# Patient Record
Sex: MALE | Race: WHITE
[De-identification: names, ages, dates, MRNs, and addresses within clinical notes are randomized per-mention and may not be internally consistent; named-entity substitution may affect disease eponyms.]

---

## 2019-01-24 ENCOUNTER — HOSPITAL ENCOUNTER (EMERGENCY)
Dept: HOSPITAL 8 - ED | Age: 79
Discharge: HOME | End: 2019-01-24
Payer: MEDICARE

## 2019-01-24 VITALS — BODY MASS INDEX: 20.57 KG/M2 | HEIGHT: 69 IN | WEIGHT: 138.89 LBS

## 2019-01-24 VITALS — DIASTOLIC BLOOD PRESSURE: 90 MMHG | SYSTOLIC BLOOD PRESSURE: 154 MMHG

## 2019-01-24 DIAGNOSIS — N40.1: Primary | ICD-10-CM

## 2019-01-24 DIAGNOSIS — R33.8: ICD-10-CM

## 2019-01-24 LAB
ALBUMIN SERPL-MCNC: 3.5 G/DL (ref 3.4–5)
ANION GAP SERPL CALC-SCNC: 8 MMOL/L (ref 5–15)
BASOPHILS # BLD AUTO: 0.03 X10^3/UL (ref 0–0.1)
BASOPHILS NFR BLD AUTO: 0 % (ref 0–1)
CALCIUM SERPL-MCNC: 11.5 MG/DL (ref 8.5–10.1)
CHLORIDE SERPL-SCNC: 107 MMOL/L (ref 98–107)
CREAT SERPL-MCNC: 0.75 MG/DL (ref 0.7–1.3)
CULTURE INDICATED?: NO
EOSINOPHIL # BLD AUTO: 0.01 X10^3/UL (ref 0–0.4)
EOSINOPHIL NFR BLD AUTO: 0 % (ref 1–7)
ERYTHROCYTE [DISTWIDTH] IN BLOOD BY AUTOMATED COUNT: 13.3 % (ref 9.4–14.8)
LYMPHOCYTES # BLD AUTO: 0.66 X10^3/UL (ref 1–3.4)
LYMPHOCYTES NFR BLD AUTO: 5 % (ref 22–44)
MCH RBC QN AUTO: 31.5 PG (ref 27.5–34.5)
MCHC RBC AUTO-ENTMCNC: 33.8 G/DL (ref 33.2–36.2)
MCV RBC AUTO: 93.1 FL (ref 81–97)
MD: NO
MICROSCOPIC: (no result)
MONOCYTES # BLD AUTO: 0.29 X10^3/UL (ref 0.2–0.8)
MONOCYTES NFR BLD AUTO: 2 % (ref 2–9)
NEUTROPHILS # BLD AUTO: 13.52 X10^3/UL (ref 1.8–6.8)
NEUTROPHILS NFR BLD AUTO: 93 % (ref 42–75)
PLATELET # BLD AUTO: 214 X10^3/UL (ref 130–400)
PMV BLD AUTO: 9.4 FL (ref 7.4–10.4)
RBC # BLD AUTO: 5.18 X10^6/UL (ref 4.38–5.82)

## 2019-01-24 PROCEDURE — 80048 BASIC METABOLIC PNL TOTAL CA: CPT

## 2019-01-24 PROCEDURE — 85025 COMPLETE CBC W/AUTO DIFF WBC: CPT

## 2019-01-24 PROCEDURE — 51702 INSERT TEMP BLADDER CATH: CPT

## 2019-01-24 PROCEDURE — 99284 EMERGENCY DEPT VISIT MOD MDM: CPT

## 2019-01-24 PROCEDURE — 36415 COLL VENOUS BLD VENIPUNCTURE: CPT

## 2019-01-24 PROCEDURE — 82040 ASSAY OF SERUM ALBUMIN: CPT

## 2019-01-24 PROCEDURE — 81001 URINALYSIS AUTO W/SCOPE: CPT

## 2019-01-24 NOTE — NUR
TASK RN: PT REPORTS URINARY RETENTION X LAST NIGHT. LAST NORMAL URINATION LAST 
NIGHT AT 2200. PT ALSO CO CONSTIPATION, LBM TWO DAYS AGO, "THAT ISN'T NOT 
NORMAL FOR ME". PT DENIES BLOOD IN URINE/FLANK PAIN/N/V/FEVER. HX OF ENLARGED 
PROSTATE, "BUT ANOTHER DOCTOR TOOK A LOOK AT ME AND SAID I DIDN'T HAVE AN 
ENLARGED PROSTATE". 



BLADDER SCAN COMPLETE, >999ML. ERP AWARE. VERBAL ORDER RECEIVED FOR SOLITARIO. 12F 
COUDE TIP SOLITARIO INSERTED WO DIFFICULTY W/ IMMEDIATE URINE RETURN. 700ML OUT. PT 
W/ IMMEDIATE RELIEF. 



UA COLLECTED AWAITING ORDER TO SEND TO LAB.

## 2019-01-26 ENCOUNTER — HOSPITAL ENCOUNTER (EMERGENCY)
Dept: HOSPITAL 8 - ED | Age: 79
Discharge: HOME | End: 2019-01-26
Payer: MEDICARE

## 2019-01-26 VITALS — DIASTOLIC BLOOD PRESSURE: 73 MMHG | SYSTOLIC BLOOD PRESSURE: 126 MMHG

## 2019-01-26 VITALS — HEIGHT: 69 IN | WEIGHT: 136.91 LBS | BODY MASS INDEX: 20.28 KG/M2

## 2019-01-26 DIAGNOSIS — T83.098D: ICD-10-CM

## 2019-01-26 DIAGNOSIS — R10.2: Primary | ICD-10-CM

## 2019-01-26 LAB
CULTURE INDICATED?: YES
MICROSCOPIC: (no result)

## 2019-01-26 PROCEDURE — 87086 URINE CULTURE/COLONY COUNT: CPT

## 2019-01-26 PROCEDURE — 81001 URINALYSIS AUTO W/SCOPE: CPT

## 2019-01-26 PROCEDURE — 99283 EMERGENCY DEPT VISIT LOW MDM: CPT

## 2019-01-26 NOTE — NUR
PT PRESENTED WITH LEAKING SOLITARIO CATHETER THAT WAS PLACED HERE AT [facility] 
THURSDAY. PT STATES THAT URINE IS LEAKING AROUND THE CATHETER. ERP AT BEDSIDE 
FOR EVAL

## 2019-09-10 ENCOUNTER — HOSPITAL ENCOUNTER (EMERGENCY)
Dept: HOSPITAL 8 - ED | Age: 79
Discharge: HOME | End: 2019-09-10
Payer: MEDICARE

## 2019-09-10 VITALS — HEIGHT: 69 IN | BODY MASS INDEX: 20.02 KG/M2 | WEIGHT: 135.14 LBS

## 2019-09-10 VITALS — DIASTOLIC BLOOD PRESSURE: 80 MMHG | SYSTOLIC BLOOD PRESSURE: 119 MMHG

## 2019-09-10 DIAGNOSIS — N40.1: Primary | ICD-10-CM

## 2019-09-10 DIAGNOSIS — F17.200: ICD-10-CM

## 2019-09-10 DIAGNOSIS — R33.8: ICD-10-CM

## 2019-09-10 LAB
ALBUMIN SERPL-MCNC: 3.5 G/DL (ref 3.4–5)
ANION GAP SERPL CALC-SCNC: 6 MMOL/L (ref 5–15)
BASOPHILS # BLD AUTO: 0.02 X10^3/UL (ref 0–0.1)
BASOPHILS NFR BLD AUTO: 0 % (ref 0–1)
CALCIUM SERPL-MCNC: 11.2 MG/DL (ref 8.5–10.1)
CHLORIDE SERPL-SCNC: 105 MMOL/L (ref 98–107)
CREAT SERPL-MCNC: 0.79 MG/DL (ref 0.7–1.3)
CULTURE INDICATED?: NO
EOSINOPHIL # BLD AUTO: 0.02 X10^3/UL (ref 0–0.4)
EOSINOPHIL NFR BLD AUTO: 0 % (ref 1–7)
ERYTHROCYTE [DISTWIDTH] IN BLOOD BY AUTOMATED COUNT: 13 % (ref 9.4–14.8)
LYMPHOCYTES # BLD AUTO: 1.06 X10^3/UL (ref 1–3.4)
LYMPHOCYTES NFR BLD AUTO: 8 % (ref 22–44)
MCH RBC QN AUTO: 32.4 PG (ref 27.5–34.5)
MCHC RBC AUTO-ENTMCNC: 33.4 G/DL (ref 33.2–36.2)
MCV RBC AUTO: 96.9 FL (ref 81–97)
MD: NO
MICROSCOPIC: (no result)
MONOCYTES # BLD AUTO: 0.36 X10^3/UL (ref 0.2–0.8)
MONOCYTES NFR BLD AUTO: 3 % (ref 2–9)
NEUTROPHILS # BLD AUTO: 11.57 X10^3/UL (ref 1.8–6.8)
NEUTROPHILS NFR BLD AUTO: 89 % (ref 42–75)
PLATELET # BLD AUTO: 197 X10^3/UL (ref 130–400)
PMV BLD AUTO: 8.7 FL (ref 7.4–10.4)
RBC # BLD AUTO: 4.96 X10^6/UL (ref 4.38–5.82)

## 2019-09-10 PROCEDURE — 36415 COLL VENOUS BLD VENIPUNCTURE: CPT

## 2019-09-10 PROCEDURE — 85025 COMPLETE CBC W/AUTO DIFF WBC: CPT

## 2019-09-10 PROCEDURE — 51702 INSERT TEMP BLADDER CATH: CPT

## 2019-09-10 PROCEDURE — 81003 URINALYSIS AUTO W/O SCOPE: CPT

## 2019-09-10 PROCEDURE — 80048 BASIC METABOLIC PNL TOTAL CA: CPT

## 2019-09-10 PROCEDURE — 99284 EMERGENCY DEPT VISIT MOD MDM: CPT

## 2019-09-10 PROCEDURE — 82040 ASSAY OF SERUM ALBUMIN: CPT

## 2019-09-10 NOTE — NUR
SOLITARIO CARE INSTRUCTION GIVEN, LEG BAG AND CLEANING AND EMPTYING.  PT VERBALZIED 
UNDERSTANDING WILL FOLLOW UP WITH UROLOGY

## 2019-09-10 NOTE — NUR
SOLITARIO INSERTED USING STERILE TECH 16 COUDE, IMMEDIATLEY RECEIVED 850CC OF CLEAR 
YELLOW URINE,UA TO LAB

## 2019-09-10 NOTE — NUR
TASK RN: First contact with pt. Pt states, "It started at 3:30 this morning. I 
can dribble a little and that helps relieve some of the pressure, but I am not 
really peeing. I have had this happen before." NADN. Bladder scan shows >878mL 
of urine. Call light within reach. Pt denies blood in urine, stool, n/v/d, sob, 
cp, trauma, or syncope.

## 2019-09-10 NOTE — NUR
TASK RN: Pt ambulaes from triage to room with steady gait and balance. NADN. No 
obvious defecits observed.

## 2020-04-05 ENCOUNTER — HOSPITAL ENCOUNTER (EMERGENCY)
Dept: HOSPITAL 8 - ED | Age: 80
Discharge: HOME | End: 2020-04-05
Payer: MEDICARE

## 2020-04-05 VITALS — BODY MASS INDEX: 19.36 KG/M2 | HEIGHT: 69 IN | WEIGHT: 130.73 LBS

## 2020-04-05 VITALS — SYSTOLIC BLOOD PRESSURE: 112 MMHG | DIASTOLIC BLOOD PRESSURE: 65 MMHG

## 2020-04-05 DIAGNOSIS — E83.52: ICD-10-CM

## 2020-04-05 DIAGNOSIS — N40.1: Primary | ICD-10-CM

## 2020-04-05 DIAGNOSIS — R33.8: ICD-10-CM

## 2020-04-05 LAB
ALBUMIN SERPL-MCNC: 3.8 G/DL (ref 3.4–5)
ANION GAP SERPL CALC-SCNC: 7 MMOL/L (ref 5–15)
BASOPHILS # BLD AUTO: 0.04 X10^3/UL (ref 0–0.1)
BASOPHILS NFR BLD AUTO: 0 % (ref 0–1)
CALCIUM SERPL-MCNC: 11.6 MG/DL (ref 8.5–10.1)
CHLORIDE SERPL-SCNC: 106 MMOL/L (ref 98–107)
CREAT SERPL-MCNC: 0.73 MG/DL (ref 0.7–1.3)
CULTURE INDICATED?: NO
EOSINOPHIL # BLD AUTO: 0.01 X10^3/UL (ref 0–0.4)
EOSINOPHIL NFR BLD AUTO: 0 % (ref 1–7)
ERYTHROCYTE [DISTWIDTH] IN BLOOD BY AUTOMATED COUNT: 13.4 % (ref 9.4–14.8)
LYMPHOCYTES # BLD AUTO: 0.76 X10^3/UL (ref 1–3.4)
LYMPHOCYTES NFR BLD AUTO: 7 % (ref 22–44)
MCH RBC QN AUTO: 31.2 PG (ref 27.5–34.5)
MCHC RBC AUTO-ENTMCNC: 33.1 G/DL (ref 33.2–36.2)
MCV RBC AUTO: 94.4 FL (ref 81–97)
MD: NO
MICROSCOPIC: (no result)
MONOCYTES # BLD AUTO: 0.38 X10^3/UL (ref 0.2–0.8)
MONOCYTES NFR BLD AUTO: 4 % (ref 2–9)
NEUTROPHILS # BLD AUTO: 9.22 X10^3/UL (ref 1.8–6.8)
NEUTROPHILS NFR BLD AUTO: 89 % (ref 42–75)
PLATELET # BLD AUTO: 210 X10^3/UL (ref 130–400)
PMV BLD AUTO: 9.1 FL (ref 7.4–10.4)
RBC # BLD AUTO: 5.25 X10^6/UL (ref 4.38–5.82)

## 2020-04-05 PROCEDURE — 99284 EMERGENCY DEPT VISIT MOD MDM: CPT

## 2020-04-05 PROCEDURE — 80048 BASIC METABOLIC PNL TOTAL CA: CPT

## 2020-04-05 PROCEDURE — 51702 INSERT TEMP BLADDER CATH: CPT

## 2020-04-05 PROCEDURE — 36415 COLL VENOUS BLD VENIPUNCTURE: CPT

## 2020-04-05 PROCEDURE — 85025 COMPLETE CBC W/AUTO DIFF WBC: CPT

## 2020-04-05 PROCEDURE — 81001 URINALYSIS AUTO W/SCOPE: CPT

## 2020-04-05 PROCEDURE — 82040 ASSAY OF SERUM ALBUMIN: CPT

## 2020-04-05 NOTE — NUR
pt reports bladder pressure relieved, approx 1000mL total clear yellow urine 
drained from holley bag. MD aware. pt given dc instructions with instructions to 
call urologist on dc paperwork for follow up appt. pt given education regarding 
holley cleaning/maintenance. pt ambulatory to dc desk with steady gait, all 
questions answered.

## 2020-04-05 NOTE — NUR
pt presents to ED with c/o urinary retention since yesterday pm, pt states he 
hx urinary retention/holley placement in the past, pt states he has been 
prescribed meds for urinary retention but did not fill rx as he was fearful of 
listed side effects. pt states he has had difficulty with urine leaking around 
holley in past, this RN placed 18F coude holley cath with EDMD's ok. Clear yellow 
urine draining freely from holley at this time with no leakage. urine sample 
collected from holley placement, sent to lab. awaiting lab results and dispo at 
this time.

## 2020-12-26 ENCOUNTER — HOSPITAL ENCOUNTER (EMERGENCY)
Dept: HOSPITAL 8 - ED | Age: 80
Discharge: HOME | End: 2020-12-26
Payer: MEDICARE

## 2020-12-26 VITALS — HEIGHT: 68 IN | BODY MASS INDEX: 18.21 KG/M2 | WEIGHT: 120.15 LBS

## 2020-12-26 VITALS — DIASTOLIC BLOOD PRESSURE: 71 MMHG | SYSTOLIC BLOOD PRESSURE: 106 MMHG

## 2020-12-26 DIAGNOSIS — J15.9: Primary | ICD-10-CM

## 2020-12-26 DIAGNOSIS — F17.220: ICD-10-CM

## 2020-12-26 DIAGNOSIS — R00.1: ICD-10-CM

## 2020-12-26 DIAGNOSIS — F17.210: ICD-10-CM

## 2020-12-26 LAB
<PLATELET ESTIMATE>: (no result)
<PLT MORPHOLOGY>: (no result)
ALBUMIN SERPL-MCNC: 3 G/DL (ref 3.4–5)
ALP SERPL-CCNC: 114 U/L (ref 45–117)
ALT SERPL-CCNC: 19 U/L (ref 12–78)
ANION GAP SERPL CALC-SCNC: 3 MMOL/L (ref 5–15)
BAND#(MANUAL): 0.94 X10^3/UL
BILIRUB DIRECT SERPL-MCNC: NORMAL MG/DL
BILIRUB SERPL-MCNC: 0.5 MG/DL (ref 0.2–1)
CALCIUM SERPL-MCNC: 13.4 MG/DL (ref 8.5–10.1)
CHLORIDE SERPL-SCNC: 101 MMOL/L (ref 98–107)
CREAT SERPL-MCNC: 1.02 MG/DL (ref 0.7–1.3)
ERYTHROCYTE [DISTWIDTH] IN BLOOD BY AUTOMATED COUNT: 12.2 % (ref 9.4–14.8)
LYMPH#(MANUAL): 2.12 X10^3/UL (ref 1–3.4)
LYMPHS% (MANUAL): 9 % (ref 22–44)
MCH RBC QN AUTO: 31 PG (ref 27.5–34.5)
MCHC RBC AUTO-ENTMCNC: 33.6 G/DL (ref 33.2–36.2)
MD: YES
METAMYELOCYTES# (MANUAL): 0.24 X10^3/UL (ref 0–0)
METAMYELOCYTES% (MANUAL): 1 % (ref 0–1)
MONOS#(MANUAL): 1.65 X10^3/UL (ref 0.3–2.7)
MONOS% (MANUAL): 7 % (ref 2–9)
NEUTS BAND NFR BLD: 4 % (ref 0–7)
PLATELET # BLD AUTO: 470 X10^3/UL (ref 130–400)
PMV BLD AUTO: 8 FL (ref 7.4–10.4)
PROT SERPL-MCNC: 8.4 G/DL (ref 6.4–8.2)
RBC # BLD AUTO: 5.25 X10^6/UL (ref 4.38–5.82)
SEG#(MANUAL): 18.57 X10^3/UL (ref 1.8–6.8)
SEGS% (MANUAL): 79 % (ref 42–75)
TROPONIN I SERPL-MCNC: < 0.015 NG/ML (ref 0–0.04)

## 2020-12-26 PROCEDURE — 99285 EMERGENCY DEPT VISIT HI MDM: CPT

## 2020-12-26 PROCEDURE — 80053 COMPREHEN METABOLIC PANEL: CPT

## 2020-12-26 PROCEDURE — 71250 CT THORAX DX C-: CPT

## 2020-12-26 PROCEDURE — 96365 THER/PROPH/DIAG IV INF INIT: CPT

## 2020-12-26 PROCEDURE — 93005 ELECTROCARDIOGRAM TRACING: CPT

## 2020-12-26 PROCEDURE — 85025 COMPLETE CBC W/AUTO DIFF WBC: CPT

## 2020-12-26 PROCEDURE — 74022 RADEX COMPL AQT ABD SERIES: CPT

## 2020-12-26 PROCEDURE — 96368 THER/DIAG CONCURRENT INF: CPT

## 2020-12-26 PROCEDURE — 99406 BEHAV CHNG SMOKING 3-10 MIN: CPT

## 2020-12-26 PROCEDURE — 96366 THER/PROPH/DIAG IV INF ADDON: CPT

## 2020-12-26 PROCEDURE — 84484 ASSAY OF TROPONIN QUANT: CPT

## 2020-12-26 PROCEDURE — 36415 COLL VENOUS BLD VENIPUNCTURE: CPT

## 2020-12-26 PROCEDURE — 87040 BLOOD CULTURE FOR BACTERIA: CPT
